# Patient Record
Sex: MALE | Race: WHITE
[De-identification: names, ages, dates, MRNs, and addresses within clinical notes are randomized per-mention and may not be internally consistent; named-entity substitution may affect disease eponyms.]

---
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
## 2020-02-25 ENCOUNTER — HOSPITAL ENCOUNTER (EMERGENCY)
Dept: HOSPITAL 50 - VM.ED | Age: 20
Discharge: HOME | End: 2020-02-25
Payer: COMMERCIAL

## 2020-02-25 VITALS — SYSTOLIC BLOOD PRESSURE: 137 MMHG | DIASTOLIC BLOOD PRESSURE: 87 MMHG | HEART RATE: 76 BPM

## 2020-02-25 DIAGNOSIS — S20.20XA: ICD-10-CM

## 2020-02-25 DIAGNOSIS — S27.321A: Primary | ICD-10-CM
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
## 2020-02-25 NOTE — EDM.PDOC
ED HPI GENERAL MEDICAL PROBLEM





- General


Chief Complaint: General


Stated Complaint: left side and back pain


Time Seen by Provider: 02/25/20 21:58


Source of Information: Reports: Patient


History Limitations: Reports: No Limitations





- History of Present Illness


INITIAL COMMENTS - FREE TEXT/NARRATIVE: 





Patient fell from about 6 feet. Landed on his back. He was changing a wiper on 

a tractor when it broke off and he fell. No loss of consciousness. He does have 

pleuritic pain. No previous fractures that he knows of within the thoracic 

cavity otherwise he has had fractures of the fingers before. Pulse oximeter is 

normal. He does not like needles. He is in mild distress.


Onset: Today


Duration: Waxing/Waning


Location: Reports: Other (Left chest. Left back.)


Quality: Reports: Burning, Pressure


Severity: Moderate


Improves with: Reports: None


Worsens with: Reports: Breathing


Context: Reports: Trauma


Associated Symptoms: Reports: cough w sputum, Malaise, Shortness of Breath, 

Other (Hematoemesis)


  ** Left Back


Pain Score (Numeric/FACES): 7





- Related Data


 Allergies











Allergy/AdvReac Type Severity Reaction Status Date / Time


 


No Known Allergies Allergy   Verified 02/25/20 21:53











Home Meds: 


 Home Meds





. [No Known Home Meds]  09/23/17 [History]











Past Medical History





- Past Health History


Medical/Surgical History: Denies Medical/Surgical History


Cardiovascular History: Reports: Arrhythmia, Other (See Below)


Other Cardiovascular History: enlarged heart





Social & Family History





- Caffeine Use


Caffeine Use: Reports: Energy Drinks





ED ROS GENERAL





- Review of Systems


Review Of Systems: See Below


Constitutional: Reports: No Symptoms


HEENT: Reports: Vertigo (some)


Respiratory: Reports: Pleuritic Chest Pain


Cardiovascular: Reports: Chest Pain (localized and reproducible), Dyspnea on 

Exertion, Lightheadedness (slight)


GI/Abdominal: Reports: No Symptoms


Musculoskeletal: Reports: Back Pain


Skin: Reports: No Symptoms


Neurological: Reports: No Symptoms


Psychiatric: Reports: No Symptoms


Hematologic/Lymphatic: Reports: No Symptoms


Immunologic: Reports: No Symptoms





ED EXAM, GENERAL





- Physical Exam


Exam: See Below


Exam Limited By: No Limitations


General Appearance: Alert, Mild Distress


Eye Exam: Bilateral Eye: Normal Fundi, Normal Inspection


Ears: Normal External Exam


Nose: Normal Inspection


Throat/Mouth: Normal Inspection


Head: Normocephalic


Neck: Normal Inspection, Supple


Respiratory/Chest: Lungs Clear, Decreased Breath Sounds, Other (Pain on 

palpation about the left side. Also about the left back. No crepitus.).  No: 

Chest Non-Tender


Cardiovascular: Regular Rate, Rhythm


Back Exam: Normal Inspection, Full Range of Motion, Muscle Spasm, Paraspinal 

Tenderness.  No: Vertebral Tenderness


Extremities: Normal Inspection, Normal Range of Motion


Neurological: Alert, Oriented


Psychiatric: Normal Affect


Skin Exam: Warm, Dry





Course





- Vital Signs


Last Recorded V/S: 


 Last Vital Signs











Temp  36.2 C   02/25/20 21:54


 


Pulse  76   02/25/20 21:54


 


Resp  18   02/25/20 21:54


 


BP  137/87   02/25/20 21:54


 


Pulse Ox  100   02/25/20 21:54














- Orders/Labs/Meds


Orders: 


 Active Orders 24 hr











 Category Date Time Status


 


 Chest wo Cont [CT] Stat Exams  02/25/20 22:08 Taken














Departure





- Departure


Time of Disposition: 23:04


Disposition: Home, Self-Care 01


Condition: Good


Clinical Impression: 


Left pulmonary contusion


Qualifiers:


 Encounter type: initial encounter Qualified Code(s): S27.321A - Contusion of 

lung, unilateral, initial encounter





Contusion of thoracic wall


Qualifiers:


 Encounter type: initial encounter Contusion of thoracic wall detail: 

unspecified area of thoracic wall Qualified Code(s): S20.20XA - Contusion of 

thorax, unspecified, initial encounter








- Discharge Information


*PRESCRIPTION DRUG MONITORING PROGRAM REVIEWED*: Not Applicable


*COPY OF PRESCRIPTION DRUG MONITORING REPORT IN PATIENT [facility]: Not Applicable


Instructions:  Chest Contusion, Adult, Easy-to-Read


Forms:  ED Department Discharge, ED Return to Work/School Form


Additional Instructions: 


Ice and Ibuprofen (400-600 mg 2-3 times a day x 5 days) . Deep breaths as 

tolerated. Return if symptoms worsen. Did CT scan to rule out pneumothorax and 

rib fractures. Anticipate recovery to be about a week plus secondary to 

contusions. Work Restrictions include- do nothing to aggravate the injury.  





Sepsis Event Note





- Evaluation


Sepsis Screening Result: No Definite Risk





- Focused Exam


Vital Signs: 


 Vital Signs











  Temp Pulse Resp BP Pulse Ox


 


 02/25/20 21:54  36.2 C  76  18  137/87  100











Date Exam was Performed: 02/25/20


Time Exam was Performed: 23:04





- My Orders


Last 24 Hours: 


My Active Orders





02/25/20 22:08


Chest wo Cont [CT] Stat 














- Assessment/Plan


Last 24 Hours: 


My Active Orders





02/25/20 22:08


Chest wo Cont [CT] Stat

## 2020-02-26 NOTE — CT
______________________________________________________________________________   

  

[id number] CT/CT Chest WO IV  

EXAM: CT Chest WO IV  

   

 CLINICAL DATA: TRAUMA, FALL FROM TRACTOR, INJURY TO LEFT CHEST/BACK  

   

 COMPARISON: None.  

   

 FINDINGS:   

   

 LUNGS: Clear. No pneumothorax or effusion. No endobronchial lesions.  

   

 HEART AND GREAT VESSELS: Normal.  

   

 MEDIASTINUM AND LYMPHATICS: No mediastinal or hilar lymphadenopathy.  

   

 UPPER ABDOMINAL ORGANS: Normal.  

   

 BONES: Scattered changes of spondylosis in the spine. No fracture or osseous  

 lesion.  

   

 IMPRESSION:  

   

 No significant abnormality in the chest.  

   

 Electronically signed by Mayur Carrillo MD on 2/26/2020 7:45 AM  

   

  

Mayur Carrillo DO                 

 02/26/20 [id number]    

  

Thank you for allowing us to participate in the care of your patient.

## 2024-09-18 ENCOUNTER — HOSPITAL ENCOUNTER (EMERGENCY)
Dept: HOSPITAL 50 - VM.ED | Age: 24
Discharge: HOME | End: 2024-09-18
Payer: COMMERCIAL

## 2024-09-18 VITALS — SYSTOLIC BLOOD PRESSURE: 130 MMHG | DIASTOLIC BLOOD PRESSURE: 81 MMHG | HEART RATE: 85 BPM

## 2024-09-18 DIAGNOSIS — T63.441A: Primary | ICD-10-CM
